# Patient Record
Sex: FEMALE | Race: WHITE | Employment: FULL TIME | URBAN - METROPOLITAN AREA
[De-identification: names, ages, dates, MRNs, and addresses within clinical notes are randomized per-mention and may not be internally consistent; named-entity substitution may affect disease eponyms.]

---

## 2017-07-25 ENCOUNTER — HOSPITAL ENCOUNTER (OUTPATIENT)
Age: 47
Discharge: HOME | End: 2017-07-25
Attending: FAMILY MEDICINE

## 2017-07-25 VITALS
HEIGHT: 63 IN | BODY MASS INDEX: 39.87 KG/M2 | DIASTOLIC BLOOD PRESSURE: 85 MMHG | TEMPERATURE: 99 F | OXYGEN SATURATION: 96 % | WEIGHT: 225 LBS | HEART RATE: 84 BPM | RESPIRATION RATE: 16 BRPM | SYSTOLIC BLOOD PRESSURE: 117 MMHG

## 2017-07-25 DIAGNOSIS — H57.89 REDNESS OF LEFT EYE: ICD-10-CM

## 2017-07-25 DIAGNOSIS — H00.014 HORDEOLUM EXTERNUM OF LEFT UPPER EYELID: Primary | ICD-10-CM

## 2017-07-25 PROCEDURE — 99204 OFFICE O/P NEW MOD 45 MIN: CPT

## 2017-07-25 PROCEDURE — 99203 OFFICE O/P NEW LOW 30 MIN: CPT

## 2017-07-25 RX ORDER — GENTAMICIN SULFATE 3 MG/ML
2 SOLUTION/ DROPS OPHTHALMIC 3 TIMES DAILY
Qty: 1 BOTTLE | Refills: 0 | Status: SHIPPED | OUTPATIENT
Start: 2017-07-25 | End: 2017-08-01

## 2017-07-26 NOTE — ED INITIAL ASSESSMENT (HPI)
Pt sts redness to left eye today. Redness increased since this evening. + itching and watery drng. Does not wear contacts. Denies cold or allergy symptoms.

## 2017-07-26 NOTE — ED PROVIDER NOTES
Patient Seen in: [id number] [facility] [place]    History   Patient presents with:  Eye Problem    Stated Complaint: left eye redness    HPI    78-year-old female presents to the clinic today with chief complaints of a painful lump in the left upper ey air)    Current:/85   Pulse 84   Temp 99.4 °F (37.4 °C) (Temporal)   Resp 16   Ht 160 cm (5' 3\")   Wt 102.1 kg   LMP 07/20/2017   SpO2 96%   BMI 39.86 kg/m²         Physical Exam    General: Well-nourished, well hydrated. No acute distress.  No pallo in the next 24-48 hours, recommend following up with ophthalmology. Patient will be referred to Dr. Griselda Aguayo.       Disposition and Plan     Clinical Impression:  Hordeolum externum of left upper eyelid  (primary encounter diagnosis)  Redness of left eye

## 2017-09-26 ENCOUNTER — OFFICE VISIT (OUTPATIENT)
Dept: FAMILY MEDICINE CLINIC | Facility: CLINIC | Age: 47
End: 2017-09-26

## 2017-09-26 VITALS
HEART RATE: 64 BPM | BODY MASS INDEX: 39.62 KG/M2 | WEIGHT: 223.63 LBS | DIASTOLIC BLOOD PRESSURE: 72 MMHG | HEIGHT: 63 IN | TEMPERATURE: 99 F | RESPIRATION RATE: 16 BRPM | SYSTOLIC BLOOD PRESSURE: 126 MMHG

## 2017-09-26 DIAGNOSIS — H02.823: Primary | ICD-10-CM

## 2017-09-26 PROCEDURE — 99213 OFFICE O/P EST LOW 20 MIN: CPT | Performed by: NURSE PRACTITIONER

## 2017-09-26 NOTE — PATIENT INSTRUCTIONS
Warm,moist compresses three times a day for 15 minutes. Apply ointment twice a day to the area. If not better, in one week - see ophthalmology. Referral done. Otherwise follow-up as needed.

## 2017-09-26 NOTE — PROGRESS NOTES
HPI:    Patient ID: Paulino Domingo is a 40year old female. HPI     Patient is present with concern about lump to the inner aspect of her right eye. States that it has been there for a couple of months. Thinks that it has grown recently.   Denies an Sig: Place 1 Application into the right eye 2 (two) times daily. Imaging & Referrals:  OPHTHALMOLOGY - EXTERNAL        Patient Instructions   Warm,moist compresses three times a day for 15 minutes. Apply ointment twice a day to the area.    If

## 2018-02-21 PROBLEM — E55.9 VITAMIN D DEFICIENCY: Status: ACTIVE | Noted: 2018-02-21

## 2018-02-21 PROBLEM — F32.2 CURRENT SEVERE EPISODE OF MAJOR DEPRESSIVE DISORDER WITHOUT PSYCHOTIC FEATURES WITHOUT PRIOR EPISODE (HCC): Status: ACTIVE | Noted: 2018-02-21

## 2018-04-06 PROBLEM — F32.2 CURRENT SEVERE EPISODE OF MAJOR DEPRESSIVE DISORDER WITHOUT PSYCHOTIC FEATURES WITHOUT PRIOR EPISODE (HCC): Status: RESOLVED | Noted: 2018-02-21 | Resolved: 2018-04-06

## 2018-04-06 PROBLEM — F32.5 MAJOR DEPRESSION IN REMISSION (HCC): Status: ACTIVE | Noted: 2018-04-06

## 2018-10-16 PROCEDURE — 87086 URINE CULTURE/COLONY COUNT: CPT | Performed by: EMERGENCY MEDICINE

## 2020-01-11 ENCOUNTER — TELEPHONE (OUTPATIENT)
Dept: SCHEDULING | Age: 50
End: 2020-01-11

## 2020-01-11 ENCOUNTER — WALK IN (OUTPATIENT)
Dept: URGENT CARE | Age: 50
End: 2020-01-11

## 2020-01-11 VITALS
DIASTOLIC BLOOD PRESSURE: 60 MMHG | SYSTOLIC BLOOD PRESSURE: 107 MMHG | WEIGHT: 239.09 LBS | TEMPERATURE: 98.4 F | BODY MASS INDEX: 42.36 KG/M2 | HEIGHT: 63 IN | OXYGEN SATURATION: 99 % | HEART RATE: 86 BPM

## 2020-01-11 DIAGNOSIS — J20.9 ACUTE BRONCHITIS, UNSPECIFIED ORGANISM: ICD-10-CM

## 2020-01-11 DIAGNOSIS — R09.81 NASAL CONGESTION: ICD-10-CM

## 2020-01-11 DIAGNOSIS — R07.89 CHEST TIGHTNESS: Primary | ICD-10-CM

## 2020-01-11 DIAGNOSIS — R05.9 COUGH: ICD-10-CM

## 2020-01-11 PROCEDURE — 99203 OFFICE O/P NEW LOW 30 MIN: CPT | Performed by: NURSE PRACTITIONER

## 2020-01-11 RX ORDER — AZITHROMYCIN 250 MG/1
TABLET, FILM COATED ORAL
Qty: 6 TABLET | Refills: 0 | Status: SHIPPED | OUTPATIENT
Start: 2020-01-11

## 2020-01-11 ASSESSMENT — ENCOUNTER SYMPTOMS
CHEST TIGHTNESS: 1
COUGH: 1